# Patient Record
Sex: FEMALE | Race: WHITE | Employment: FULL TIME | URBAN - METROPOLITAN AREA
[De-identification: names, ages, dates, MRNs, and addresses within clinical notes are randomized per-mention and may not be internally consistent; named-entity substitution may affect disease eponyms.]

---

## 2019-06-17 ENCOUNTER — OFFICE VISIT (OUTPATIENT)
Dept: ENT CLINIC | Age: 59
End: 2019-06-17
Payer: COMMERCIAL

## 2019-06-17 VITALS
HEIGHT: 63 IN | SYSTOLIC BLOOD PRESSURE: 122 MMHG | DIASTOLIC BLOOD PRESSURE: 76 MMHG | BODY MASS INDEX: 28 KG/M2 | RESPIRATION RATE: 14 BRPM | WEIGHT: 158 LBS | HEART RATE: 76 BPM | OXYGEN SATURATION: 99 %

## 2019-06-17 DIAGNOSIS — R04.0 EPISTAXIS: Primary | ICD-10-CM

## 2019-06-17 DIAGNOSIS — J32.0 CHRONIC MAXILLARY SINUSITIS: ICD-10-CM

## 2019-06-17 PROCEDURE — 99243 OFF/OP CNSLTJ NEW/EST LOW 30: CPT | Performed by: OTOLARYNGOLOGY

## 2019-06-17 RX ORDER — INSULIN GLARGINE 100 [IU]/ML
INJECTION, SOLUTION SUBCUTANEOUS
Refills: 3 | COMMUNITY
Start: 2019-04-05

## 2019-06-17 RX ORDER — KETOROLAC TROMETHAMINE 5 MG/ML
SOLUTION OPHTHALMIC
Refills: 6 | COMMUNITY
Start: 2019-05-26

## 2019-06-17 RX ORDER — HYDROXYZINE PAMOATE 25 MG/1
25 CAPSULE ORAL
COMMUNITY
Start: 2018-10-09 | End: 2019-06-17 | Stop reason: CLARIF

## 2019-06-17 RX ORDER — LOSARTAN POTASSIUM 100 MG/1
100 TABLET ORAL
COMMUNITY
Start: 2019-05-28 | End: 2021-08-04

## 2019-06-17 RX ORDER — CITALOPRAM 20 MG/1
20 TABLET ORAL
COMMUNITY
Start: 2019-01-29 | End: 2019-06-17 | Stop reason: CLARIF

## 2019-06-17 RX ORDER — METFORMIN HYDROCHLORIDE 750 MG/1
TABLET, EXTENDED RELEASE ORAL
Refills: 3 | COMMUNITY
Start: 2019-06-04

## 2019-06-17 RX ORDER — METHIMAZOLE 5 MG/1
TABLET ORAL
Refills: 6 | COMMUNITY
Start: 2019-05-11

## 2019-06-17 RX ORDER — ROSUVASTATIN CALCIUM 10 MG/1
TABLET, COATED ORAL
Refills: 1 | COMMUNITY
Start: 2019-04-10

## 2019-06-17 RX ORDER — PEN NEEDLE, DIABETIC 31 GX5/16"
NEEDLE, DISPOSABLE MISCELLANEOUS
Refills: 3 | COMMUNITY
Start: 2019-03-31

## 2019-06-17 RX ORDER — PREDNISOLONE ACETATE 10 MG/ML
SUSPENSION/ DROPS OPHTHALMIC
Refills: 5 | COMMUNITY
Start: 2019-05-31 | End: 2019-06-17 | Stop reason: CLARIF

## 2019-06-17 RX ORDER — LINAGLIPTIN 5 MG/1
TABLET, FILM COATED ORAL
Refills: 2 | COMMUNITY
Start: 2019-05-17

## 2019-06-17 NOTE — PROGRESS NOTES
[facility]       NEW PATIENT VISIT    PCP:  Shaniqua Caldera MD    REFERRED BY:   Shaniqua Caldera MD        CHIEF COMPLAINT:  Chief Complaint   Patient presents with    Epistaxis    Nasal Congestion    Sinusitis       HISTORY OF PRESENT ILLNESS:       (Location, Quality, Severity, Duration, Timing, Context, Modifying factors, Associated symptoms)  Bettye Luna is a 59 y.o. female referred by Dr. Josette Ramírez for evaluation and treatment of a problem located in the right side of the nose. The quality is nose bleed, epistaxis. The severity is severe. The timing is off and on. \"I get weird nose bleeds. It feels like my nose is running and it's bleeding and it's profuse. I put a tissue in and stuff it in until I feel it's not bleeding. In about 10 minutes, I take the tissue out and it's full of blood and I blow a clot out and then there's nothing, no further bleeding. \"  Her most recent nosebleed occurred in March and it bled off and on for two days. Her nosebleed usually bleeds off and on for 2-3 days and then none for awhile. Each nosebleed lasts about 10-15 minutes. Nose runs all the time and pnd and a tickle. She had a deviated septum repair by Dr. Arthur King at [facility] about 10 years ago at the [address] [place] [address]. She stated that there was no mention of polyps. She was having frequent sinus infections prior to the septal surgery and \"now I hardly get them at all. \"  \"I still have problms with sinus stuffiness in my right cheek in the morning. It's the worst when I wake up the morning, but occurs all day long off and on. \"  The quality of this problem is \"stuffy right cheek sinus. \"  The severity is moderate. The duration is about 10 years. The timing is constant. The context is history of chronic sinus problem. Modifying factors include none. Associated symptoms include headaches.   Seem to be related to weather change and masses, lesions, erythema, exudate, or structural abnormalities. NECK:  Normal with no masses, tenderness, or tracheal deviation. THYROID:  Normal, with no nodules, goiter, or tenderness bilaterally. LYMPH NODES, CERVICAL, FACIAL AND SUPRACLAVICULAR:  No lymphadenopathy detected. IMPRESSION / DIAGNOSES / ORDERS:       Roma Link was seen today for epistaxis, nasal congestion and sinusitis. Diagnoses and all orders for this visit:    Epistaxis    Chronic maxillary sinusitis  -     CT Sinus WO Contrast         RECOMMENDATIONS/PLAN:    1. See patient instructions on file for this visit, which were fully discussed with the patient. 2. Return in about 1 month (around 7/17/2019) for recheck & diagnostic nasal endoscopy after CT scan (bring CD of scan to visit). >> Please note portions of this note were completed with a voice recognition program.  Efforts were made to edit the dictations but occasionally words are mis-transcribed.

## 2019-06-17 NOTE — PATIENT INSTRUCTIONS
HOME INSTRUCTIONS - NOSEBLEED    For prevention of nose bleeds, use a saline (salt water) nasal spray/mist, (eg. Ocean nasal saline mist, AYR nasal spray). This is available \"over the counter\" at your pharmacy. [place] two sprays in each nostril two to three times daily, and as needed for dryness or excessive mucus crusting, while you are awake. Do not pull crusts out of nose, refrain from \"nose-picking. \"    You may blow your nose gently, but do not move your nose around when blowing.      >>> If you have begin to have recurrent nosebleeds again, do the followin. Use a 12 hour decongestant spray, such as 0.05% oxymetazoline nasal spray (eg. Afrin). This is available \"over the counter\" at your pharmacy. [place] two sprays in each nostril three times a day for three days, then two times a day for 2 days, and then stop for two days, and then repeat the cycle once. 2. Use a saline (salt water) nasal spray/mist, (eg. Ocean nasal saline mist, AYR nasal spray). This is available \"over the counter\" at your pharmacy. [place] two sprays in each nostril every two to three hours while you are awake, for two weeks. 3. Use a bedside humidifier, at night, while sleeping. 4. Use polysporin ointment in each nostril at bedtime. This is available \"over the counter\" at your pharmacy. [place] the ointment onto the lateral wall of each nostril, gently, with a Q-tip applicator, then gently pinch and rub the nostrils as instructed. 5. If bleeding occurs, pinch your nostrils together in the soft part of the nose and push gently toward your face, and hold for ten minutes. If bleeding persists, then repeat. If bleeding still persists, dampen a cotton ball with Afrin and insert into your nostril and repeat the previous pinch maneuver. If bleeding then persists, use Nasal Cease kit, available over the counter. If nose bleed remains uncontrolled, go to emergency room.

## 2019-06-23 ENCOUNTER — HOSPITAL ENCOUNTER (OUTPATIENT)
Dept: CT IMAGING | Age: 59
Discharge: HOME | End: 2019-06-23
Payer: COMMERCIAL

## 2019-06-23 DIAGNOSIS — J32.0 CHRONIC MAXILLARY SINUSITIS: ICD-10-CM

## 2019-06-23 PROCEDURE — 70486 CT MAXILLOFACIAL W/O DYE: CPT

## 2019-07-26 ENCOUNTER — OFFICE VISIT (OUTPATIENT)
Dept: ENT CLINIC | Age: 59
End: 2019-07-26
Payer: COMMERCIAL

## 2019-07-26 VITALS
SYSTOLIC BLOOD PRESSURE: 185 MMHG | BODY MASS INDEX: 32.07 KG/M2 | HEART RATE: 107 BPM | WEIGHT: 181 LBS | HEIGHT: 63 IN | DIASTOLIC BLOOD PRESSURE: 110 MMHG

## 2019-07-26 DIAGNOSIS — J32.0 CHRONIC MAXILLARY SINUSITIS: Chronic | ICD-10-CM

## 2019-07-26 DIAGNOSIS — R04.0 EPISTAXIS: Primary | Chronic | ICD-10-CM

## 2019-07-26 PROCEDURE — 31231 NASAL ENDOSCOPY DX: CPT | Performed by: OTOLARYNGOLOGY

## 2019-07-26 NOTE — PROGRESS NOTES
PROCEDURE - Diagnostic Nasal Endoscopy, bilateral  [CPT 84046]    INFORMED CONSENT    Risks, benefits, and alternatives of diagnostic nasal endoscopy were explained to the patient. Specific mention was made of the risk of nosebleed, drainage, throat numbness with difficulty swallowing, and pain from the procedure. The patient gave oral consent to proceed. INDICATIONS/HISTORY  Nosebleed on Tuesday 7/23/19 lasting about 10 minutes. No other nosebleeds since the last visit here. Nosebleed is always on the right side. FINDINGS    NSD to right with mildly prominent blood vessels on right anterior septum but no active bleeding and no intranasal lesions. 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 Left side looks normal.  Otherwise, normal intranasal exam bilaterally, with no polyps, purulent exudate, or other abnormalities or lesions. DESCRIPTION OF PROCEDURE    The nasal cavities were decongested and anesthetized with a mixture of equal parts of 0.05% oxymetazoline solution and 4% lidocaine solution by nasal sprayer. This was repeated after 5 minutes. After an appropriate elapse of time, the 4.4 mm 30 degree rigid nasal telescope was inserted into the left nasal cavity. Endoscopy of the inferior and middle meatus and nasal cavity to the posterior choana was performed with the above findings. The procedure was repeated on the right side with the above findings. The patient tolerated the procedure well with no evidence of complication. Instructions were given to avoid eating or drinking for 1 hour. REVIEW OF IMAGES:       I independently reviewed the images of the CT scan of the sinuses, showing no evidence of sinusitis or other abnormality. See images and radiologist report for details. IMPRESSION / Felice Jaimer / Delphia Cheadle / PROCEDURES:       Hemant Pillai was seen today for epistaxis.     Diagnoses and all orders for this visit:    Epistaxis  Comments:  Appears to be minimal at this

## 2021-08-04 ENCOUNTER — ANESTHESIA EVENT (OUTPATIENT)
Dept: ENDOSCOPY | Age: 61
End: 2021-08-04
Payer: COMMERCIAL

## 2021-08-04 ENCOUNTER — ANESTHESIA (OUTPATIENT)
Dept: ENDOSCOPY | Age: 61
End: 2021-08-04
Payer: COMMERCIAL

## 2021-08-04 ENCOUNTER — HOSPITAL ENCOUNTER (OUTPATIENT)
Age: 61
Setting detail: OUTPATIENT SURGERY
Discharge: HOME | End: 2021-08-04
Attending: INTERNAL MEDICINE | Admitting: INTERNAL MEDICINE
Payer: COMMERCIAL

## 2021-08-04 VITALS
HEART RATE: 62 BPM | WEIGHT: 199 LBS | SYSTOLIC BLOOD PRESSURE: 148 MMHG | RESPIRATION RATE: 14 BRPM | DIASTOLIC BLOOD PRESSURE: 72 MMHG | TEMPERATURE: 97.6 F | BODY MASS INDEX: 35.26 KG/M2 | HEIGHT: 63 IN | OXYGEN SATURATION: 100 %

## 2021-08-04 VITALS
OXYGEN SATURATION: 94 % | DIASTOLIC BLOOD PRESSURE: 69 MMHG | SYSTOLIC BLOOD PRESSURE: 118 MMHG | RESPIRATION RATE: 19 BRPM

## 2021-08-04 LAB
GLUCOSE BLD-MCNC: 215 MG/DL (ref 70–99)
GLUCOSE BLD-MCNC: 244 MG/DL (ref 70–99)
GLUCOSE BLD-MCNC: 267 MG/DL (ref 70–99)
PERFORMED ON: ABNORMAL

## 2021-08-04 PROCEDURE — 6360000002 HC RX W HCPCS: Performed by: NURSE ANESTHETIST, CERTIFIED REGISTERED

## 2021-08-04 PROCEDURE — 3700000001 HC ADD 15 MINUTES (ANESTHESIA): Performed by: INTERNAL MEDICINE

## 2021-08-04 PROCEDURE — 2709999900 HC NON-CHARGEABLE SUPPLY: Performed by: INTERNAL MEDICINE

## 2021-08-04 PROCEDURE — 3609012400 HC EGD TRANSORAL BIOPSY SINGLE/MULTIPLE: Performed by: INTERNAL MEDICINE

## 2021-08-04 PROCEDURE — 3700000000 HC ANESTHESIA ATTENDED CARE: Performed by: INTERNAL MEDICINE

## 2021-08-04 PROCEDURE — 2580000003 HC RX 258: Performed by: NURSE ANESTHETIST, CERTIFIED REGISTERED

## 2021-08-04 PROCEDURE — 2500000003 HC RX 250 WO HCPCS: Performed by: NURSE ANESTHETIST, CERTIFIED REGISTERED

## 2021-08-04 PROCEDURE — 7100000010 HC PHASE II RECOVERY - FIRST 15 MIN: Performed by: INTERNAL MEDICINE

## 2021-08-04 PROCEDURE — 7100000011 HC PHASE II RECOVERY - ADDTL 15 MIN: Performed by: INTERNAL MEDICINE

## 2021-08-04 PROCEDURE — 6370000000 HC RX 637 (ALT 250 FOR IP): Performed by: INTERNAL MEDICINE

## 2021-08-04 RX ORDER — LIDOCAINE HYDROCHLORIDE 20 MG/ML
INJECTION, SOLUTION EPIDURAL; INFILTRATION; INTRACAUDAL; PERINEURAL PRN
Status: DISCONTINUED | OUTPATIENT
Start: 2021-08-04 | End: 2021-08-04 | Stop reason: SDUPTHER

## 2021-08-04 RX ORDER — SODIUM CHLORIDE 9 MG/ML
INJECTION, SOLUTION INTRAVENOUS CONTINUOUS PRN
Status: DISCONTINUED | OUTPATIENT
Start: 2021-08-04 | End: 2021-08-04 | Stop reason: SDUPTHER

## 2021-08-04 RX ORDER — GLYCOPYRROLATE 0.2 MG/ML
INJECTION INTRAMUSCULAR; INTRAVENOUS PRN
Status: DISCONTINUED | OUTPATIENT
Start: 2021-08-04 | End: 2021-08-04 | Stop reason: SDUPTHER

## 2021-08-04 RX ORDER — SODIUM CHLORIDE 9 MG/ML
INJECTION, SOLUTION INTRAVENOUS CONTINUOUS
Status: CANCELLED | OUTPATIENT
Start: 2021-08-04

## 2021-08-04 RX ORDER — PROPOFOL 10 MG/ML
INJECTION, EMULSION INTRAVENOUS PRN
Status: DISCONTINUED | OUTPATIENT
Start: 2021-08-04 | End: 2021-08-04 | Stop reason: SDUPTHER

## 2021-08-04 RX ADMIN — GLYCOPYRROLATE 0.2 MG: 0.2 INJECTION INTRAMUSCULAR; INTRAVENOUS at 07:54

## 2021-08-04 RX ADMIN — SODIUM CHLORIDE: 9 INJECTION, SOLUTION INTRAVENOUS at 07:26

## 2021-08-04 RX ADMIN — PROPOFOL 100 MG: 10 INJECTION, EMULSION INTRAVENOUS at 08:00

## 2021-08-04 RX ADMIN — PROPOFOL 100 MG: 10 INJECTION, EMULSION INTRAVENOUS at 07:56

## 2021-08-04 RX ADMIN — LIDOCAINE HYDROCHLORIDE 100 MG: 20 INJECTION, SOLUTION EPIDURAL; INFILTRATION; INTRACAUDAL; PERINEURAL at 07:56

## 2021-08-04 RX ADMIN — PROPOFOL 50 MG: 10 INJECTION, EMULSION INTRAVENOUS at 08:04

## 2021-08-04 ASSESSMENT — PAIN SCALES - GENERAL: PAINLEVEL_OUTOF10: 0

## 2021-08-04 NOTE — H&P
Gastroenterology Note                 Pre-operative History and Physical    Patient: Landy Crandall  : 1960  CSN:     History Obtained From:   Patient or guardian. HISTORY OF PRESENT ILLNESS:    The patient is a 56 y.o. female here for Endoscopy. Past Medical History:    No past medical history on file. Past Surgical History:    No past surgical history on file. Medications Prior to Admission:   No current facility-administered medications on file prior to encounter. Current Outpatient Medications on File Prior to Encounter   Medication Sig Dispense Refill    Levothyroxine Sodium (SYNTHROID PO) Take by mouth      METOPROLOL TARTRATE PO Take 200 mg by mouth      Magnesium 400 MG CAPS Take by mouth      metFORMIN (GLUCOPHAGE-XR) 750 MG extended release tablet TK 1 T PO Q 24 H  3    rosuvastatin (CRESTOR) 10 MG tablet TK 1 T PO HS  1    losartan (COZAAR) 100 MG tablet Take 100 mg by mouth      B-D UF III MINI PEN NEEDLES 31G X 5 MM MISC USE 1 NEEDLE WITH PEN AT BEDTIME  3    LANTUS SOLOSTAR 100 UNIT/ML injection pen INJECT 35 UNITS UNDER THE SKIN HS  3    methimazole (TAPAZOLE) 5 MG tablet TK 1 T PO D  6    TRADJENTA 5 MG tablet TK 1 T PO D  2    ketorolac (ACULAR) 0.5 % ophthalmic solution   6    insulin lispro (HUMALOG) 100 UNIT/ML injection vial Inject into the skin          Allergies:  Atorvastatin, Insulin detemir, Povidone iodine, and Simvastatin      Social History:   Social History     Tobacco Use    Smoking status: Never Smoker    Smokeless tobacco: Never Used   Substance Use Topics    Alcohol use: Not on file     Family History:   No family history on file. PHYSICAL EXAM:      BP (!) 197/93   Pulse 72   Temp 96.9 °F (36.1 °C) (Temporal)   Resp 18   Ht 5' 3\" (1.6 m)   Wt 199 lb (90.3 kg)   SpO2 99%   BMI 35.25 kg/m²  I        Heart:  RRR, normal s1s2    Lungs:  CTA and normal effort    Abdomen:   Soft, nt nd.          ASSESSMENT AND PLAN:    1. Patient is a 56 y.o. female here for endoscopy with MAC sedation. 2.  Procedure options, risks and benefits reviewed with patient and/or guardian. They express understanding.

## 2021-08-04 NOTE — ANESTHESIA POSTPROCEDURE EVALUATION
Department of Anesthesiology  Postprocedure Note    Patient: Michael Concepcion  MRN: [id number]  YOB: 1960  Date of evaluation: 8/4/2021  Time:  9:47 AM     Procedure Summary     Date: 08/04/21 Room / Location: [address] [place] [address]    Anesthesia Start: 3567 Anesthesia Stop: 1118    Procedure: EGD BIOPSY (N/A Abdomen) Diagnosis: (ABDOMINAL PAIN R10.9)    Surgeons: Armando Harrell MD Responsible Provider: Jayashree Motley MD    Anesthesia Type: MAC ASA Status: 2          Anesthesia Type: MAC    Abilio Phase I: Abilio Score: 10    Abilio Phase II: Abilio Score: 10    Last vitals: Reviewed and per EMR flowsheets.        Anesthesia Post Evaluation    Level of consciousness: awake  Complications: no

## 2021-08-04 NOTE — ANESTHESIA PRE PROCEDURE
Department of Anesthesiology  Preprocedure Note       Name:  Jose Ponce   Age:  56 y.o.  :  1960                                          MRN:  [id number]         Date:  2021      Surgeon: Karla Ramirez):  Tram Slaughter MD    Procedure: Procedure(s):  EGD DIAGNOSTIC ONLY    Medications prior to admission:   Prior to Admission medications    Medication Sig Start Date End Date Taking? Authorizing Provider   Levothyroxine Sodium (SYNTHROID PO) Take by mouth   Yes Historical Provider, MD   METOPROLOL TARTRATE PO Take 200 mg by mouth   Yes Historical Provider, MD   Magnesium 400 MG CAPS Take by mouth   Yes Historical Provider, MD   metFORMIN (GLUCOPHAGE-XR) 750 MG extended release tablet TK 1 T PO Q 24 H 19  Yes Historical Provider, MD   rosuvastatin (CRESTOR) 10 MG tablet TK 1 T PO HS 4/10/19  Yes Historical Provider, MD   losartan (COZAAR) 100 MG tablet Take 100 mg by mouth 19 Yes Historical Provider, MD   [place] SOLOSTAR 100 UNIT/ML injection pen INJECT 35 UNITS UNDER THE SKIN HS 19  Yes Historical Provider, MD   methimazole (TAPAZOLE) 5 MG tablet TK 1 T PO D 19   Historical Provider, MD   TRADJENTA 5 MG tablet TK 1 T PO D 19   Historical Provider, MD   ketorolac (ACULAR) 0.5 % ophthalmic solution  19   Historical Provider, MD [place] UF III MINI PEN NEEDLES 31G X 5 MM MISC USE 1 NEEDLE WITH PEN AT BEDTIME 3/31/19   Historical Provider, MD   insulin lispro (HUMALOG) 100 UNIT/ML injection vial Inject into the skin    Historical Provider, MD       Current medications:    No current facility-administered medications for this encounter. Allergies:     Allergies   Allergen Reactions    Atorvastatin      Muscle pain     Insulin Detemir      Itching and hives at injection sites    Povidone Iodine      SENSITIVITY-ITCHING    Simvastatin      myalgia       Problem List:    Patient Active Problem List   Diagnosis Code    Chronic maxillary sinusitis J32.0    Epistaxis R04.0       Past Medical History:  No past medical history on file. Past Surgical History:  No past surgical history on file. Social History:    Social History     Tobacco Use    Smoking status: Never Smoker    Smokeless tobacco: Never Used   Substance Use Topics    Alcohol use: Not on file                                Counseling given: Not Answered      Vital Signs (Current): There were no vitals filed for this visit. BP Readings from Last 3 Encounters:   07/26/19 (!) 185/110   06/17/19 122/76       NPO Status:                                                                                 BMI:   Wt Readings from Last 3 Encounters:   07/26/19 181 lb (82.1 kg)   06/17/19 158 lb (71.7 kg)     There is no height or weight on file to calculate BMI.    CBC: No results found for: WBC, RBC, HGB, HCT, MCV, RDW, PLT    CMP: No results found for: NA, K, CL, CO2, BUN, CREATININE, GFRAA, AGRATIO, LABGLOM, GLUCOSE, PROT, CALCIUM, BILITOT, ALKPHOS, AST, ALT    POC Tests: No results for input(s): POCGLU, POCNA, POCK, POCCL, POCBUN, POCHEMO, POCHCT in the last 72 hours. Coags: No results found for: PROTIME, INR, APTT    HCG (If Applicable): No results found for: PREGTESTUR, PREGSERUM, HCG, HCGQUANT     ABGs: No results found for: PHART, PO2ART, OTA6VKQ, DVV7NBS, BEART, [id number]     Type & Screen (If Applicable):  No results found for: LABABO, LABRH    Drug/Infectious Status (If Applicable):  No results found for: HIV, HEPCAB    COVID-19 Screening (If Applicable): No results found for: COVID19        Anesthesia Evaluation    Airway: Mallampati: II  TM distance: >3 FB   Neck ROM: full  Mouth opening: > = 3 FB Dental:          Pulmonary:                              Cardiovascular:            Rhythm: regular  Rate: normal                    Neuro/Psych:               GI/Hepatic/Renal:             Endo/Other:    (+) Diabetes, .                  Abdominal: Vascular: Other Findings:             Anesthesia Plan      MAC     ASA 2       Induction: intravenous. Anesthetic plan and risks discussed with patient. Plan discussed with CRNA.                   Sherita Augustine MD   8/4/2021

## 2021-08-04 NOTE — PROGRESS NOTES
Post-procedure education reviewed with patient and visitor, and they verbalized understanding. Recovery blood sugar 244. Patient is aware of elevated blood sugar and offered peanut butter and crackers. Patient states she will take her blood sugar medications at home while eating.
See anesthesia record for Propofol dosages and vital signs.
Teaching / education initiated regarding perioperative experience, expectations, and pain management during stay. Patient verbalized understanding.
To endo recovery from procedure room. VSS, awakens to voice, respirations easy and unlabored.
with a cell phone for communication. If the ride is leaving the hospital grounds please make sure they are back in time for pickup. Have the patient inform the staff on arrival what their rides plans are while the patient is in the facility. At the MAIN there is one visitor allowed. Please note that the visitor policy is subject to change.

## 2021-08-16 ENCOUNTER — HOSPITAL ENCOUNTER (OUTPATIENT)
Dept: NUCLEAR MEDICINE | Age: 61
Discharge: HOME | End: 2021-08-16
Payer: COMMERCIAL

## 2021-08-16 DIAGNOSIS — K21.00 GASTROESOPHAGEAL REFLUX DISEASE WITH ESOPHAGITIS, UNSPECIFIED WHETHER HEMORRHAGE: ICD-10-CM

## 2021-08-16 DIAGNOSIS — R10.9 ABDOMINAL PAIN, UNSPECIFIED ABDOMINAL LOCATION: ICD-10-CM

## 2021-08-16 DIAGNOSIS — R11.0 NAUSEA: ICD-10-CM

## 2021-08-16 PROCEDURE — 3430000000 HC RX DIAGNOSTIC RADIOPHARMACEUTICAL: Performed by: INTERNAL MEDICINE

## 2021-08-16 PROCEDURE — A9541 TC99M SULFUR COLLOID: HCPCS | Performed by: INTERNAL MEDICINE

## 2021-08-16 PROCEDURE — 78264 GASTRIC EMPTYING IMG STUDY: CPT

## 2021-08-16 RX ADMIN — Medication 500 MICRO CURIE: at 08:09

## 2021-08-17 ENCOUNTER — HOSPITAL ENCOUNTER (OUTPATIENT)
Dept: CT IMAGING | Age: 61
Discharge: HOME | End: 2021-08-17
Payer: COMMERCIAL

## 2021-08-17 DIAGNOSIS — R10.9 ABDOMINAL PAIN, UNSPECIFIED ABDOMINAL LOCATION: ICD-10-CM

## 2021-08-17 LAB
ANION GAP SERPL CALCULATED.3IONS-SCNC: 11 MMOL/L (ref 3–16)
BUN BLDV-MCNC: 11 MG/DL (ref 7–20)
CALCIUM SERPL-MCNC: 9.4 MG/DL (ref 8.3–10.6)
CHLORIDE BLD-SCNC: 102 MMOL/L (ref 99–110)
CO2: 25 MMOL/L (ref 21–32)
CREAT SERPL-MCNC: 0.8 MG/DL (ref 0.6–1.2)
GFR AFRICAN AMERICAN: >60
GFR NON-AFRICAN AMERICAN: >60
GLUCOSE BLD-MCNC: 257 MG/DL (ref 70–99)
POTASSIUM SERPL-SCNC: 4.3 MMOL/L (ref 3.5–5.1)
SODIUM BLD-SCNC: 138 MMOL/L (ref 136–145)

## 2021-08-17 PROCEDURE — 80048 BASIC METABOLIC PNL TOTAL CA: CPT

## 2021-08-17 PROCEDURE — 74160 CT ABDOMEN W/CONTRAST: CPT

## 2021-08-17 PROCEDURE — 36415 COLL VENOUS BLD VENIPUNCTURE: CPT

## 2021-08-17 PROCEDURE — 6360000004 HC RX CONTRAST MEDICATION: Performed by: INTERNAL MEDICINE

## 2021-08-17 RX ADMIN — IOHEXOL 50 ML: 240 INJECTION, SOLUTION INTRATHECAL; INTRAVASCULAR; INTRAVENOUS; ORAL at 08:24

## 2021-08-17 RX ADMIN — IOPAMIDOL 75 ML: 755 INJECTION, SOLUTION INTRAVENOUS at 08:24

## (undated) DEVICE — BW-412T DISP COMBO CLEANING BRUSH: Brand: SINGLE USE COMBINATION CLEANING BRUSH

## (undated) DEVICE — SOLUTION IV IRRIG WATER 500ML POUR BRL ST 2F7113

## (undated) DEVICE — SET VLV 3 PC AWS DISPOSABLE GRDIAN SCOPEVALET

## (undated) DEVICE — SYRINGE MED 50ML LUERLOCK TIP

## (undated) DEVICE — MOUTHPIECE ENDOSCP L CTRL OPN AND SIDE PORTS DISP

## (undated) DEVICE — FORCEPS BX L240CM WRK CHN 2.8MM STD CAP W/ NDL MIC MESH

## (undated) DEVICE — GOWN AURORA NONREINF LG: Brand: MEDLINE INDUSTRIES, INC.

## (undated) DEVICE — PROCEDURE KIT ENDOSCP CUST